# Patient Record
Sex: FEMALE | Race: WHITE | Employment: FULL TIME | URBAN - METROPOLITAN AREA
[De-identification: names, ages, dates, MRNs, and addresses within clinical notes are randomized per-mention and may not be internally consistent; named-entity substitution may affect disease eponyms.]

---

## 2018-08-28 ENCOUNTER — RADIANT APPOINTMENT (OUTPATIENT)
Dept: MAMMOGRAPHY | Facility: CLINIC | Age: 48
End: 2018-08-28
Attending: OBSTETRICS & GYNECOLOGY
Payer: COMMERCIAL

## 2018-08-28 ENCOUNTER — OFFICE VISIT (OUTPATIENT)
Dept: OBGYN | Facility: CLINIC | Age: 48
End: 2018-08-28
Payer: COMMERCIAL

## 2018-08-28 VITALS
BODY MASS INDEX: 26.95 KG/M2 | SYSTOLIC BLOOD PRESSURE: 108 MMHG | DIASTOLIC BLOOD PRESSURE: 68 MMHG | WEIGHT: 171.7 LBS | HEIGHT: 67 IN

## 2018-08-28 DIAGNOSIS — Z12.31 VISIT FOR SCREENING MAMMOGRAM: ICD-10-CM

## 2018-08-28 DIAGNOSIS — Z00.00 ROUTINE HISTORY AND PHYSICAL EXAMINATION OF ADULT: Primary | ICD-10-CM

## 2018-08-28 PROCEDURE — 77067 SCR MAMMO BI INCL CAD: CPT | Mod: TC

## 2018-08-28 PROCEDURE — G0145 SCR C/V CYTO,THINLAYER,RESCR: HCPCS | Performed by: OBSTETRICS & GYNECOLOGY

## 2018-08-28 PROCEDURE — 99396 PREV VISIT EST AGE 40-64: CPT | Performed by: OBSTETRICS & GYNECOLOGY

## 2018-08-28 PROCEDURE — 87624 HPV HI-RISK TYP POOLED RSLT: CPT | Performed by: OBSTETRICS & GYNECOLOGY

## 2018-08-28 NOTE — NURSING NOTE
"Chief Complaint   Patient presents with     Gyn Exam   No concerns.  Savannah Davis MA      Initial /68 (BP Location: Left arm, Patient Position: Chair, Cuff Size: Adult Regular)  Ht 5' 6.5\" (1.689 m)  Wt 171 lb 11.2 oz (77.9 kg)  LMP 2018  BMI 27.3 kg/m2 Estimated body mass index is 27.3 kg/(m^2) as calculated from the following:    Height as of this encounter: 5' 6.5\" (1.689 m).    Weight as of this encounter: 171 lb 11.2 oz (77.9 kg).  BP completed using cuff size: regular        The following HM Due: pap smear      The following patient reported/Care Every where data was sent to:  P ABSTRACT QUALITY INITIATIVES [[id number]]                      "

## 2018-08-28 NOTE — MR AVS SNAPSHOT
"              After Visit Summary   8/28/2018    Avis Dinero    MRN: [id number]           Patient Information     Date Of Birth          1970        Visit Information        Provider Department      8/28/2018 3:30 PM Bhupinder Miller MD [facility]        Today's Diagnoses     Routine history and physical examination of adult    -  1       Follow-ups after your visit        Who to contact     If you have questions or need follow up information about today's clinic visit or your schedule please contact [facility] directly at [phone].  Normal or non-critical lab and imaging results will be communicated to you by Happy Cosashart, letter or phone within 4 business days after the clinic has received the results. If you do not hear from us within 7 days, please contact the clinic through Happy Cosashart or phone. If you have a critical or abnormal lab result, we will notify you by phone as soon as possible.  Submit refill requests through [employer] or call your pharmacy and they will forward the refill request to us. Please allow 3 business days for your refill to be completed.          Additional Information About Your Visit        MyChart Information     [employer] gives you secure access to your electronic health record. If you see a primary care provider, you can also send messages to your care team and make appointments. If you have questions, please call your primary care clinic.  If you do not have a primary care provider, please call [phone] and they will assist you.        Care EveryWhere ID     This is your Care EveryWhere ID. This could be used by other organizations to access your [facility] medical records  [id number]        Your Vitals Were     Height Last Period BMI (Body Mass Index)             5' 6.5\" (1.689 m) 08/18/2018 27.3 kg/m2          Blood Pressure from Last 3 Encounters:   08/28/18 108/68   11/03/15 104/60   06/25/15 134/80    Weight from Last 3 " Encounters:   08/28/18 171 lb 11.2 oz (77.9 kg)   11/03/15 153 lb (69.4 kg)   06/25/15 142 lb (64.4 kg)              Today, you had the following     No orders found for display       Primary Care Provider Office Phone # Fax #    Bhupinder Miller [phone]       [address] [facility] [address]        Equal Access to Services     MARYA OSMAN : Hadii aad ku hadasho Soomaali, waaxda luqadaha, qaybta kaalmada adeegyada, waxay idiin hayaan adeeg kharash la'catrachito . So [facility] [phone].    ATENCIÓN: Si habla español, tiene a zamora disposición servicios gratuitos de asistencia lingüística. Llame al [phone].    We comply with applicable federal civil rights laws and [place] laws. We do not discriminate on the basis of race, color, national origin, age, disability, sex, sexual orientation, or gender identity.            Thank you!     Thank you for choosing [facility]  for your care. Our goal is always to provide you with excellent care. Hearing back from our patients is one way we can continue to improve our services. Please take a few minutes to complete the written survey that you may receive in the mail after your visit with us. Thank you!             Your Updated Medication List - Protect others around you: Learn how to safely use, store and throw away your medicines at www.disposemymeds.org.      Notice  As of 8/28/2018  4:03 PM    You have not been prescribed any medications.

## 2018-08-28 NOTE — PROGRESS NOTES
"SUBJECTIVE:  Avis Dinero is a 48 year old,  female,  P2 woman who presents for annual exam. Patient's last menstrual period was 2018.  Periods are sporatic q 28-60 days, lasting 5 day s. Dysmenorrhea none. Saturates 1 pad or tampon in 2 hours.  Cyclic symptoms include breast tenderness, bloating, fluid retention and irritability.    Current contraception: none   History of abnormal Pap smear: yes - ASCUS, nl. repeat  Regular self breast exam: No  Family history of breast can cer: no. Colon cancer no. Ovarian cancer no.  History of abnormal lipids: no      Past Medical History:   Diagnosis Date     Bowel obstruction      Other and unspecified adverse effect of drug, medicinal and biological substance      Urinary tract infection, site not specified        Past Surgical History:   Procedure Laterality Date     CHOLECYSTECTOMY         No current outpatient prescriptions on file.     No current facility-administered medications for this visit.      Allergies   Allergen Reactions     Penicillins Hives       Social History   Substance Use Topics     Smoking status: Former Smoker     Smokeless tobacco: Never Used      Comment: quit      Alcohol use Yes      Comment: occassionally       Review of Systems    CONSTITUTIONAL:NEGATIVE  EYES: NEGATIVE  ENT/MOUTH: NEGATIVE  RESP: NEGATIVE  CV: NEGATIVE  GI: NEGATIVE  : NEGATIVE  MUSCULOSKELATAL: NEGATIVE  INTEGUMENTARY/SKIN: NEGATIVE  BREAST: NEGATIVE  NEURO: NEGATIVE.      OBJECTIVE:  /68 (BP Location: Left arm, Patient Position: Chair, Cuff Size: Adult Regular)  Ht 5' 6.5\" (1.689 m)  Wt 171 lb 11.2 oz (77.9 kg)  LMP 2018  BMI 27.3 kg/m2  General appearance: Healthy.  Skin: Normal.  Mental Status: cooperative, normal affect, no gross thought process defects.  Thyroid: Normal to palpation, no enlargement or nodules noted.  Breasts: Symmetric without mass tenderness or discharge.  Axillary nodes negative.  Lungs: Clear to " auscultation.   Heart.: Normal rate and rhythm.  No murmurs, clicks or gallops.   Abdomen: BS active. Soft, non-tender, no masses or organomegaly.    Pelvis: normal external genitalia, normal groin lymphatics, normal urethral meatus, normal vaginal mucosa, normal cervix, normal adnexa, no masses or tenderness, uterus normal size and shape and uterus antiverted.   Extremities: Normal     ASSESSMENT:  Satisfactory annual gyn exam    PLAN:    1) Pap smear  2) Mammography, lipids at appropriate intervals        PE: reviewed health maintenance including diet, regular exercise and periodic exams.

## 2018-08-31 LAB
COPATH REPORT: NORMAL
PAP: NORMAL

## 2018-09-04 LAB
FINAL DIAGNOSIS: NORMAL
HPV HR 12 DNA CVX QL NAA+PROBE: NEGATIVE
HPV16 DNA SPEC QL NAA+PROBE: NEGATIVE
HPV18 DNA SPEC QL NAA+PROBE: NEGATIVE
SPECIMEN DESCRIPTION: NORMAL
SPECIMEN SOURCE CVX/VAG CYTO: NORMAL

## 2018-09-12 ENCOUNTER — TELEPHONE (OUTPATIENT)
Dept: OBGYN | Facility: CLINIC | Age: 48
End: 2018-09-12

## 2018-09-12 NOTE — TELEPHONE ENCOUNTER
"Dr. Miller - please advise.  Pap result notes copied and pasted below [2nd request for provider review]:    \"Notes Recorded by Papo Grier, RN on 9/4/2018 at 4:17 PM  Hi Dr Miller,  Current pap is NIL with Neg HPV. . Office visit note states:    \"History of abnormal Pap smear: yes - ASCUS, nl. Repeat\"    I do not find any abnormal pap results in EPIC and pt does not have care everywhere in chart. Okay to recommend cotest in five years?\"       Thanks!  MARISELA De La ON, RN - Pap Tracking    "

## 2019-09-30 ENCOUNTER — HEALTH MAINTENANCE LETTER (OUTPATIENT)
Age: 49
End: 2019-09-30

## 2020-01-20 DIAGNOSIS — Z12.31 VISIT FOR SCREENING MAMMOGRAM: ICD-10-CM

## 2020-01-20 PROCEDURE — 77067 SCR MAMMO BI INCL CAD: CPT | Mod: TC

## 2020-02-17 ENCOUNTER — OFFICE VISIT (OUTPATIENT)
Dept: OBGYN | Facility: CLINIC | Age: 50
End: 2020-02-17
Payer: COMMERCIAL

## 2020-02-17 VITALS
SYSTOLIC BLOOD PRESSURE: 118 MMHG | WEIGHT: 173 LBS | BODY MASS INDEX: 27.15 KG/M2 | DIASTOLIC BLOOD PRESSURE: 80 MMHG | HEIGHT: 67 IN

## 2020-02-17 DIAGNOSIS — Z00.00 ANNUAL PHYSICAL EXAM: Primary | ICD-10-CM

## 2020-02-17 DIAGNOSIS — Z12.11 SPECIAL SCREENING FOR MALIGNANT NEOPLASMS, COLON: ICD-10-CM

## 2020-02-17 DIAGNOSIS — Z23 ENCOUNTER FOR IMMUNIZATION: ICD-10-CM

## 2020-02-17 PROCEDURE — 90471 IMMUNIZATION ADMIN: CPT | Performed by: OBSTETRICS & GYNECOLOGY

## 2020-02-17 PROCEDURE — 90715 TDAP VACCINE 7 YRS/> IM: CPT | Performed by: OBSTETRICS & GYNECOLOGY

## 2020-02-17 PROCEDURE — 90750 HZV VACC RECOMBINANT IM: CPT | Performed by: OBSTETRICS & GYNECOLOGY

## 2020-02-17 PROCEDURE — 90472 IMMUNIZATION ADMIN EACH ADD: CPT | Performed by: OBSTETRICS & GYNECOLOGY

## 2020-02-17 PROCEDURE — 99396 PREV VISIT EST AGE 40-64: CPT | Mod: 25 | Performed by: OBSTETRICS & GYNECOLOGY

## 2020-02-17 ASSESSMENT — MIFFLIN-ST. JEOR: SCORE: 1429.41

## 2020-02-17 NOTE — PROGRESS NOTES
Avis is a 49 year old  female who presents for annual exam.     Besides routine health maintenance, she has no other health concerns today .    HPI:  The patient does not have a PCP  Two kids, and two grand children.  LMP was 2019, no severe hot flashes.  Declines STI testing  Would like shingrix vaccine today   Had mammo last month which was normal  Due for TDaP    GYNECOLOGIC HISTORY:    Patient's last menstrual period was 2019 (approximate).    Regular menses? no  Her current contraception method is: none.  She  reports that she has quit smoking. She has never used smokeless tobacco.    Patient is not sexually active.  STD testing offered?  Declined  Last PHQ-9 score on record = No flowsheet data found.  Last GAD7 score on record = No flowsheet data found.  Alcohol Score = 0    HEALTH MAINTENANCE:  Cholesterol: will order  Cholesterol   Date Value Ref Range Status   2010 166 0 - 200 mg/dL Final     Comment:     LDL Cholesterol is the primary guide to therapy.   The NCEP recommends further evaluation of: patients with cholesterol <200   mg/dL   if additional risk factors are present, cholesterol >240 mg/dL, triglycerides   >150 mg/dL, or HDL <40 mg/dL.   Last Mammo: 20, Result: Normal, Next Mammo: next year   Pap: 2-19 Neg, Neg HPV  Lab Results   Component Value Date    PAP NIL 2018    PAP NIL 2015    PAP NIL 2012   Colonoscopy:  Will discuss  Dexa:  59    Health maintenance updated:  yes    HISTORY:  OB History    Para Term  AB Living   2 2 2 0 0 2   SAB TAB Ectopic Multiple Live Births   0 0 0 0 2      # Outcome Date GA Lbr Anthony/2nd Weight Sex Delivery Anes PTL Lv   2 Term 99 40w0d 10:00 3.912 kg (8 lb 10 oz) M    ROSA      Birth Comments: none      Name: Seth   1 Term 07/10[birth year]96 40w0d 14:00 3.459 kg (7 lb 10 oz) F    ROSA      Birth Comments: none      Name: Bashir       Patient Active Problem List   Diagnosis     CARDIOVASCULAR  "SCREENING; LDL GOAL LESS THAN 160     SBO (small bowel obstruction) (H)     Past Surgical History:   Procedure Laterality Date     CHOLECYSTECTOMY        Social History     Tobacco Use     Smoking status: Former Smoker     Smokeless tobacco: Never Used     Tobacco comment: quit    Substance Use Topics     Alcohol use: Yes     Comment: occassionally      Problem (# of Occurrences) Relation (Name,Age of Onset)    C.A.D. (2) Father (58):  of [place], Paternal Grandfather (70)    Family History Negative (2) Mother (b 1948), Brother (b )    Heart Disease (2) Father (58):  from [place], Paternal Grandfather (70):  from [place]    Hypertension (1) Mother (b )            No current outpatient medications on file.     No current facility-administered medications for this visit.      Allergies   Allergen Reactions     Penicillins Hives       Past medical, surgical, social and family histories were reviewed and updated in EPIC.    ROS:   12 point review of systems negative other than symptoms noted below or in the HPI.  No urinary frequency or dysuria, bladder or kidney problems    EXAM:  /80 (BP Location: Left arm, Patient Position: Chair, Cuff Size: Adult Large)   Ht 1.689 m (5' 6.5\")   Wt 78.5 kg (173 lb)   LMP 2019 (Approximate)   Breastfeeding No   BMI 27.50 kg/m     BMI: Body mass index is 27.5 kg/m .    PHYSICAL EXAM:  Constitutional:   Appearance: Well nourished, well developed, alert, in no acute distress  Neck:  Lymph Nodes:  No lymphadenopathy present    Thyroid:  Gland size normal, nontender, no nodules or masses present  on palpation  Chest:  Respiratory Effort:  Breathing unlabored  Cardiovascular:    Heart: Auscultation:  Regular rate, normal rhythm, no murmurs present  Breasts: Palpation of Breasts and Axillae:  No masses present on palpation, no breast tenderness. and No nodularity, asymmetry or nipple discharge bilaterally.  Gastrointestinal:   Abdominal Examination:  " Abdomen nontender to palpation, tone normal without rigidity or guarding, no masses present, umbilicus without lesions   Liver and Spleen:  No hepatomegaly present, liver nontender to palpation    Hernias:  No hernias present  Lymphatic: Lymph Nodes:  No other lymphadenopathy present  Skin:  General Inspection:  No rashes present, no lesions present, no areas of  discoloration  Neurologic:    Mental Status:  Oriented X3.  Normal strength and tone, sensory exam                grossly normal, mentation intact and speech normal.    Psychiatric:   Mentation appears normal and affect normal/bright.         Pelvic Exam:  External Genitalia:     Normal appearance for age, no discharge present, no tenderness present, no inflammatory lesions present, color normal  Vagina:     Normal vaginal vault without central or paravaginal defects, no discharge present, no inflammatory lesions present, no masses present  Bladder:     Nontender to palpation  Urethra:   Urethral Body:  Urethra palpation normal, urethra structural support normal   Urethral Meatus:  No erythema or lesions present  Cervix:     Appearance healthy, no lesions present, nontender to palpation, no bleeding present  Uterus:     Uterus: firm, normal sized and nontender, anteverted in position.   Adnexa:     No adnexal tenderness present, no adnexal masses present  Perineum:     Perineum within normal limits, no evidence of trauma, no rashes or skin lesions present  Anus:     Anus within normal limits, no hemorrhoids present  Inguinal Lymph Nodes:     No lymphadenopathy present  Pubic Hair:     Normal pubic hair distribution for age  Genitalia and Groin:     No rashes present, no lesions present, no areas of discoloration, no masses present      COUNSELING:   Reviewed preventive health counseling, as reflected in patient instructions       Immunizations    Vaccinated for: TDAP  And shingrix           Colon cancer screening       ([place])menopause management    BMI:  Body mass index is 27.5 kg/m .      ASSESSMENT:  49 year old female with satisfactory annual exam.    PLAN:  1. Annual physical exam  Declines STI screening.  Pap smear up to date.  Mammogram up to date.  Colonoscopy - discussed colonoscopy vs FIT testing, she will start with the FIT.  Labs due today.  Immunizations - s/p TDaP and shingrix today.  Discussed weight control, activity, and aging gracefully.   - Lipid panel reflex to direct LDL Fasting; Future  - **TSH with free T4 reflex FUTURE anytime; Future  - **Comprehensive metabolic panel FUTURE 1yr; Future    2. Encounter for immunization  - ZOSTER VACCINE RECOMBINANT ADJUVANTED IM NJX  - TDAP VACCINE    3. Special screening for malignant neoplasms, colon  - Fecal colorectal cancer screen FIT; Future     Paulina Shipman MD

## 2020-02-17 NOTE — NURSING NOTE
"Chief Complaint   Patient presents with     Physical       Initial /80 (BP Location: Left arm, Patient Position: Chair, Cuff Size: Adult Large)   Ht 1.689 m (5' 6.5\")   Wt 78.5 kg (173 lb)   LMP 2019 (Approximate)   Breastfeeding No   BMI 27.50 kg/m   Estimated body mass index is 27.5 kg/m  as calculated from the following:    Height as of this encounter: 1.689 m (5' 6.5\").    Weight as of this encounter: 78.5 kg (173 lb).  BP completed using cuff size: regular    Questioned patient about current smoking habits.  Pt. has never smoked.          The following HM Due: NONE    Ethel Magana CMA    "

## 2020-05-29 ENCOUNTER — ALLIED HEALTH/NURSE VISIT (OUTPATIENT)
Dept: NURSING | Facility: CLINIC | Age: 50
End: 2020-05-29
Payer: COMMERCIAL

## 2020-05-29 DIAGNOSIS — Z23 NEED FOR VACCINATION: Primary | ICD-10-CM

## 2020-05-29 PROCEDURE — 90750 HZV VACC RECOMBINANT IM: CPT

## 2020-05-29 PROCEDURE — 90471 IMMUNIZATION ADMIN: CPT

## 2020-05-29 NOTE — PROGRESS NOTES
Patient consents to receive outdoor care: Yes    Upon arrival, patient instructed to proceed to designated location, place vehicle in park, turn off, and remove keys     If we are unable to safely and ergonomically able to provide care- is the patient able to safely able to get out of car and transfer to a chair? Yes    Patient would like to receive their AVS via [email or web address].    Prior to immunization administration, verified patients identity using patient s name and date of birth. Please see Immunization Activity for additional information.     Screening Questionnaire for Adult Immunization    Are you sick today?   No   Do you have allergies to medications, food, a vaccine component or latex?   Yes   Have you ever had a serious reaction after receiving a vaccination?   No   Do you have a long-term health problem with heart, lung, kidney, or metabolic disease (e.g., diabetes), asthma, a blood disorder, no spleen, complement component deficiency, a cochlear implant, or a spinal fluid leak?  Are you on long-term aspirin therapy?   No   Do you have cancer, leukemia, HIV/AIDS, or any other immune system problem?   No   Do you have a parent, brother, or sister with an immune system problem?   No   In the past 3 months, have you taken medications that affect  your immune system, such as prednisone, other steroids, or anticancer drugs; drugs for the treatment of rheumatoid arthritis, Crohn s disease, or psoriasis; or have you had radiation treatments?   No   Have you had a seizure, or a brain or other nervous system problem?   No   During the past year, have you received a transfusion of blood or blood    products, or been given immune (gamma) globulin or antiviral drug?   No   For women: Are you pregnant or is there a chance you could become       pregnant during the next month?   No   Have you received any vaccinations in the past 4 weeks?   No     Immunization questionnaire was positive for at least one answer.  Notified  Varun Ro MD       Per orders of Dr. Ro, injection of Shingrix given by Rosetta Urban MA. Patient instructed to remain in clinic for 15 minutes afterwards, and to report any adverse reaction to me immediately.       Screening performed by Rosetta Urban MA on 5/29/2020 at 12:52 PM.

## 2020-10-06 ENCOUNTER — IMMUNIZATION (OUTPATIENT)
Dept: NURSING | Facility: CLINIC | Age: 50
End: 2020-10-06
Payer: COMMERCIAL

## 2020-10-06 PROCEDURE — 90471 IMMUNIZATION ADMIN: CPT

## 2020-10-06 PROCEDURE — 90682 RIV4 VACC RECOMBINANT DNA IM: CPT

## 2021-02-15 DIAGNOSIS — Z12.31 VISIT FOR SCREENING MAMMOGRAM: ICD-10-CM

## 2021-02-15 PROCEDURE — 77067 SCR MAMMO BI INCL CAD: CPT | Mod: TC | Performed by: RADIOLOGY

## 2021-04-10 ENCOUNTER — IMMUNIZATION (OUTPATIENT)
Dept: LAB | Facility: CLINIC | Age: 51
End: 2021-04-10
Payer: COMMERCIAL

## 2021-05-09 ENCOUNTER — HEALTH MAINTENANCE LETTER (OUTPATIENT)
Age: 51
End: 2021-05-09

## 2021-05-10 ENCOUNTER — OFFICE VISIT (OUTPATIENT)
Dept: OBGYN | Facility: CLINIC | Age: 51
End: 2021-05-10
Payer: COMMERCIAL

## 2021-05-10 VITALS
HEIGHT: 67 IN | WEIGHT: 164 LBS | DIASTOLIC BLOOD PRESSURE: 68 MMHG | BODY MASS INDEX: 25.74 KG/M2 | SYSTOLIC BLOOD PRESSURE: 110 MMHG

## 2021-05-10 DIAGNOSIS — Z01.419 ENCOUNTER FOR GYNECOLOGICAL EXAMINATION WITHOUT ABNORMAL FINDING: Primary | ICD-10-CM

## 2021-05-10 DIAGNOSIS — Z12.11 SCREEN FOR COLON CANCER: ICD-10-CM

## 2021-05-10 PROCEDURE — 99396 PREV VISIT EST AGE 40-64: CPT | Performed by: OBSTETRICS & GYNECOLOGY

## 2021-05-10 ASSESSMENT — MIFFLIN-ST. JEOR: SCORE: 1384.54

## 2021-05-10 NOTE — PROGRESS NOTES
Avis is a 50 year old  female who presents for annual exam.     Besides routine health maintenance, she has no other health concerns today .    HPI:  The patient does not have a PCP    Works for an [employer], did the last covid year at home.  She still intermittently was seeing her daughter with two grand kids (ages 1 and 3) and her son lives with her.   had a stroke in 2021, still recovering, he is off work and still has some issues with problem solving and memory but generally is doing pretty well.  Has a small 35 pound dog, she does get activity going for walks with the dog.    Was going to do a FIT last year, didn't do it because of covid.  Got J&J vaccine last month, just before it was taken off the market.  No issues.   Did mammogram this year already.   Not yet due for pap smear.     GYNECOLOGIC HISTORY:    Patient's last menstrual period was 2021 (approximate).    Regular menses? no  Menses every few months.  Length of menses: 4 days    Her current contraception method is: none.  She  reports that she has quit smoking. She has never used smokeless tobacco.    Patient is not sexually active.  STD testing offered?  Declined  Last PHQ-9 score on record = No flowsheet data found.  Last GAD7 score on record = No flowsheet data found.  Alcohol Score = 0    HEALTH MAINTENANCE:    Last Mammo: 2021, Result: Normal, *  Pap: 2018 Nil. Neg hpv  Lab Results   Component Value Date    PAP NIL 2018    PAP NIL 2015    PAP NIL 2012     Colonoscopy:  Will do fit test, ordered    Health maintenance updated:  yes    HISTORY:  OB History    Para Term  AB Living   2 2 2 0 0 2   SAB TAB Ectopic Multiple Live Births   0 0 0 0 2      # Outcome Date GA Lbr Anthony/2nd Weight Sex Delivery Anes PTL Lv   2 Term 99 40w0d 10:00 3.912 kg (8 lb 10 oz) M    ROSA      Birth Comments: none      Name: Seth Lee Term [birth year] 40w0d 14:00 3.459 kg (7 lb 10 oz) F     "ROSA      Birth Comments: none      Name: Bashir       Patient Active Problem List   Diagnosis     CARDIOVASCULAR SCREENING; LDL GOAL LESS THAN 160     SBO (small bowel obstruction) (H)     Past Surgical History:   Procedure Laterality Date     CHOLECYSTECTOMY        Social History     Tobacco Use     Smoking status: Former Smoker     Smokeless tobacco: Never Used     Tobacco comment: quit    Substance Use Topics     Alcohol use: Yes     Comment: occassionally      Problem (# of Occurrences) Relation (Name,Age of Onset)    C.A.D. (2) Father (58):  of [place], Paternal Grandfather (70)    Family History Negative (2) Mother (b 1948), Brother (b )    Heart Disease (2) Father (58):  from [place], Paternal Grandfather (70):  from [place]    Hypertension (1) Mother (b )    Parkinsonism (1) Mother (b )            No current outpatient medications on file.     No current facility-administered medications for this visit.      Allergies   Allergen Reactions     Penicillins Hives       Past medical, surgical, social and family histories were reviewed and updated in EPIC.    ROS:   12 point review of systems negative other than symptoms noted below or in the HPI.  No urinary frequency or dysuria, bladder or kidney problems    EXAM:  /68 (BP Location: Right arm, Patient Position: Chair, Cuff Size: Adult Regular)   Ht 1.691 m (5' 6.56\")   Wt 74.4 kg (164 lb)   LMP 2021 (Approximate)   Breastfeeding No   BMI 26.03 kg/m     BMI: Body mass index is 26.03 kg/m .    PHYSICAL EXAM:  Constitutional:   Appearance: Well nourished, well developed, alert, in no acute distress  Neck:  Lymph Nodes:  No lymphadenopathy present    Thyroid:  Gland size normal, nontender, no nodules or masses present  on palpation  Chest:  Respiratory Effort:  Breathing unlabored  Cardiovascular:    Heart: Auscultation:  Regular rate, normal rhythm, no murmurs present  Breasts: Inspection of Breasts:  No lymphadenopathy " present., Palpation of Breasts and Axillae:  No masses present on palpation, no breast tenderness., Axillary Lymph Nodes:  No lymphadenopathy present. and No nodularity, asymmetry or nipple discharge bilaterally.  Gastrointestinal:   Abdominal Examination:  Abdomen nontender to palpation, tone normal without rigidity or guarding, no masses present, umbilicus without lesions   Liver and Spleen:  No hepatomegaly present, liver nontender to palpation    Hernias:  No hernias present  Lymphatic: Lymph Nodes:  No other lymphadenopathy present  Skin:  General Inspection:  No rashes present, no lesions present, no areas of  discoloration  Neurologic:    Mental Status:  Oriented X3.  Normal strength and tone, sensory exam                grossly normal, mentation intact and speech normal.    Psychiatric:   Mentation appears normal and affect normal/bright.         Pelvic Exam:  External Genitalia:     Normal appearance for age, no discharge present, no tenderness present, no inflammatory lesions present, color normal  Vagina:     Normal vaginal vault without central or paravaginal defects, no discharge present, no inflammatory lesions present, no masses present  Bladder:     Nontender to palpation  Urethra:   Urethral Body:  Urethra palpation normal, urethra structural support normal   Urethral Meatus:  No erythema or lesions present  Cervix:     Appearance healthy, no lesions present, nontender to palpation, no bleeding present  Uterus:     Uterus: firm, normal sized and nontender, anteverted in position.   Adnexa:     No adnexal tenderness present, no adnexal masses present  Perineum:     Perineum within normal limits, no evidence of trauma, no rashes or skin lesions present  Anus:     Anus within normal limits, no hemorrhoids present  Inguinal Lymph Nodes:     No lymphadenopathy present  Pubic Hair:     Normal pubic hair distribution for age  Genitalia and Groin:     No rashes present, no lesions present, no areas of  discoloration, no masses present      COUNSELING:   Reviewed preventive health counseling, as reflected in patient instructions    BMI: Body mass index is 26.03 kg/m .      ASSESSMENT:  50 year old female with satisfactory annual exam.    PLAN:  1. Encounter for gynecological examination without abnormal finding   Pap smear up to date.  Mammogram up to date.  Colonoscopy - fit test ordered.  Labs not due today.  Immunizations up to date.  Discussed weight control, activity, and aging gracefully.     2. Screen for colon cancer  - Fecal colorectal cancer screen FIT; Future     Paulina Shipman MD

## 2021-05-10 NOTE — NURSING NOTE
"Chief Complaint   Patient presents with     Physical       Initial /68 (BP Location: Right arm, Patient Position: Chair, Cuff Size: Adult Regular)   Ht 1.691 m (5' 6.56\")   Wt 74.4 kg (164 lb)   LMP 2021 (Approximate)   Breastfeeding No   BMI 26.03 kg/m   Estimated body mass index is 26.03 kg/m  as calculated from the following:    Height as of this encounter: 1.691 m (5' 6.56\").    Weight as of this encounter: 74.4 kg (164 lb).  BP completed using cuff size: regular    Questioned patient about current smoking habits.  Pt. has never smoked.          The following HM Due: NONE    Ethel Magana CMA    "

## 2021-10-24 ENCOUNTER — HEALTH MAINTENANCE LETTER (OUTPATIENT)
Age: 51
End: 2021-10-24

## 2021-11-03 ENCOUNTER — IMMUNIZATION (OUTPATIENT)
Dept: NURSING | Facility: CLINIC | Age: 51
End: 2021-11-03
Payer: COMMERCIAL

## 2021-11-03 PROCEDURE — 0064A PR COVID VAC MODERNA 100 MCG/0.5 ML IM: CPT

## 2021-11-03 PROCEDURE — 91301 PR COVID VAC MODERNA 100 MCG/0.5 ML IM: CPT

## 2022-02-17 ENCOUNTER — TELEPHONE (OUTPATIENT)
Dept: GASTROENTEROLOGY | Facility: CLINIC | Age: 52
End: 2022-02-17
Payer: COMMERCIAL

## 2022-02-17 DIAGNOSIS — Z11.59 ENCOUNTER FOR SCREENING FOR OTHER VIRAL DISEASES: Primary | ICD-10-CM

## 2022-02-17 NOTE — TELEPHONE ENCOUNTER
Screening Questions  Blue=prep questions Red=location Green=sedation   1. Are you active on mychart? y    2. What insurance is in the chart? [employer]     3.  Ordering/Referring Provider: Ramonita    4. BMI 24.3, If greater than 40 review exclusion criteria also will need EXTENDED PREP    5.  Respiratory Screening (If yes to any of the following HOSPITAL setting only):     Do you use daily home oxygen? n  Do you have mod to severe Obstructive Sleep Apnea? n (can be seen at [facility] or hospital setting)    Do you have Pulmonary Hypertension? n   Do you have UNCONTROLLED asthma? n    6. Have you had a heart or lung transplant? n  (If yes, please review exclusion criteria)    7. Are you currently on dialysis?n  (If yes, schedule in HOSPITAL setting only)(If yes, please send Golytely prep)    8. Do you have chronic kidney disease? n (If yes, please send Golytely prep)    9. Have you had a stroke or Transient ischemic attack (TIA) within 6 months? n (If yes, do not schedule at [facility])    10. In the past 6 months, have you had any heart related issues including cardiomyopathy or heart attack? n (If yes, please review exclusion criteria)           If yes, did it require cardiac stenting or other implantable device?n  (If yes, please review exclusion criteria)      11. Do you have any implantable devices in your body (pacemaker, defib, LVAD)? n (If yes, schedule at [facility])    12. Do you take nitroglycerin? If yes, how often? n (if yes, schedule at HOSPITAL setting)    13. Are you currently taking any blood thinners?n (If yes- inform patient to follow up with PCP or provider for follow up instructions)     14. Are you a diabetic? n (If yes, please send Golytely prep)    15. (Females) Are you currently pregnant?   If yes, how many weeks?      16. Are you taking any prescription pain medications on a routine schedule? n If yes, MAC sedation and patient will need EXTENDED PREP.    17. Do you have any chemical dependencies such as alcohol, street  drugs, or methadone? n If yes, MAC sedation     18. Do you have any history of post-traumatic stress syndrome, severe anxiety or history of psychosis? n  If yes, MAC sedation.     19. Do you transfer independently? y    20.  Do you have any issues with constipation? n   If yes, pt will need EXTENDED PREP     21. Preferred Pharmacy for Pre Prescription [facility]    Scheduling Details    Which Colonoscopy Prep was Sent?: Miralax  Type of Procedure Scheduled: Colonscopy  Surgeon: Elise  Date of Procedure: 3/11  Location:   Caller (Please ask for phone number if not scheduled by patient): Avis Dinero        Sedation Type: CS  Conscious Sedation- Needs  for 6 hours after the procedure  MAC/General-Needs  for 24 hours after procedure    Pre-op Required at [facility] [place] [place] and OR for MAC sedation: n  (if yes advise patient they will need a pre-op prior to procedure)      Informed patient they will need an adult  y  Cannot take any type of public or medical transportation alone    Pre-Procedure Covid test to be completed at [facility] or Externally: y    Confirmed Nurse will call to complete assessment y    Additional comments:  (DE NANDO'S PATIENTS NEED EXTENDED PREP)

## 2022-02-18 ENCOUNTER — ANCILLARY PROCEDURE (OUTPATIENT)
Dept: MAMMOGRAPHY | Facility: CLINIC | Age: 52
End: 2022-02-18
Payer: COMMERCIAL

## 2022-02-18 DIAGNOSIS — Z12.31 VISIT FOR SCREENING MAMMOGRAM: ICD-10-CM

## 2022-02-18 PROCEDURE — 77067 SCR MAMMO BI INCL CAD: CPT | Mod: TC | Performed by: RADIOLOGY

## 2022-02-18 PROCEDURE — 77063 BREAST TOMOSYNTHESIS BI: CPT | Mod: TC | Performed by: RADIOLOGY

## 2022-03-07 ENCOUNTER — LAB (OUTPATIENT)
Dept: URGENT CARE | Facility: URGENT CARE | Age: 52
End: 2022-03-07
Payer: COMMERCIAL

## 2022-03-07 DIAGNOSIS — Z11.59 ENCOUNTER FOR SCREENING FOR OTHER VIRAL DISEASES: ICD-10-CM

## 2022-03-07 PROCEDURE — U0003 INFECTIOUS AGENT DETECTION BY NUCLEIC ACID (DNA OR RNA); SEVERE ACUTE RESPIRATORY SYNDROME CORONAVIRUS 2 (SARS-COV-2) (CORONAVIRUS DISEASE [COVID-19]), AMPLIFIED PROBE TECHNIQUE, MAKING USE OF HIGH THROUGHPUT TECHNOLOGIES AS DESCRIBED BY CMS-2020-01-R: HCPCS

## 2022-03-07 PROCEDURE — U0005 INFEC AGEN DETEC AMPLI PROBE: HCPCS

## 2022-03-08 LAB — SARS-COV-2 RNA RESP QL NAA+PROBE: NEGATIVE

## 2022-03-11 ENCOUNTER — HOSPITAL ENCOUNTER (OUTPATIENT)
Facility: CLINIC | Age: 52
Discharge: HOME | End: 2022-03-11
Attending: SURGERY | Admitting: SURGERY
Payer: COMMERCIAL

## 2022-03-11 ENCOUNTER — APPOINTMENT (OUTPATIENT)
Dept: SURGERY | Facility: PHYSICIAN GROUP | Age: 52
End: 2022-03-11
Payer: COMMERCIAL

## 2022-03-11 VITALS
SYSTOLIC BLOOD PRESSURE: 112 MMHG | RESPIRATION RATE: 14 BRPM | HEIGHT: 67 IN | HEART RATE: 81 BPM | WEIGHT: 165 LBS | DIASTOLIC BLOOD PRESSURE: 81 MMHG | OXYGEN SATURATION: 100 % | BODY MASS INDEX: 25.9 KG/M2 | TEMPERATURE: 97.5 F

## 2022-03-11 LAB — COLONOSCOPY: NORMAL

## 2022-03-11 PROCEDURE — 99153 MOD SED SAME PHYS/QHP EA: CPT | Performed by: SURGERY

## 2022-03-11 PROCEDURE — 99152 MOD SED SAME PHYS/QHP 5/>YRS: CPT | Mod: 59 | Performed by: SURGERY

## 2022-03-11 PROCEDURE — 250N000011 HC RX IP 250 OP 636: Performed by: SURGERY

## 2022-03-11 PROCEDURE — 45378 DIAGNOSTIC COLONOSCOPY: CPT | Performed by: SURGERY

## 2022-03-11 PROCEDURE — 99153 MOD SED SAME PHYS/QHP EA: CPT | Mod: 59 | Performed by: SURGERY

## 2022-03-11 PROCEDURE — G0500 MOD SEDAT ENDO SERVICE >5YRS: HCPCS | Performed by: SURGERY

## 2022-03-11 PROCEDURE — G0121 COLON CA SCRN NOT HI RSK IND: HCPCS | Performed by: SURGERY

## 2022-03-11 RX ORDER — LIDOCAINE 40 MG/G
CREAM TOPICAL
Status: DISCONTINUED | OUTPATIENT
Start: 2022-03-11 | End: 2022-03-11 | Stop reason: HOSPADM

## 2022-03-11 RX ORDER — ONDANSETRON 2 MG/ML
4 INJECTION INTRAMUSCULAR; INTRAVENOUS
Status: DISCONTINUED | OUTPATIENT
Start: 2022-03-11 | End: 2022-03-11 | Stop reason: HOSPADM

## 2022-03-11 RX ORDER — FENTANYL CITRATE 50 UG/ML
INJECTION, SOLUTION INTRAMUSCULAR; INTRAVENOUS PRN
Status: COMPLETED | OUTPATIENT
Start: 2022-03-11 | End: 2022-03-11

## 2022-03-11 RX ADMIN — FENTANYL CITRATE 100 MCG: 50 INJECTION, SOLUTION INTRAMUSCULAR; INTRAVENOUS at 10:24

## 2022-03-11 RX ADMIN — MIDAZOLAM 1 MG: 1 INJECTION INTRAMUSCULAR; INTRAVENOUS at 10:33

## 2022-03-11 RX ADMIN — MIDAZOLAM 2 MG: 1 INJECTION INTRAMUSCULAR; INTRAVENOUS at 10:23

## 2022-03-11 NOTE — H&P
"[facility] Anesthesia Pre-op History and Physical    Avis Dinero MRN# [id number]   Age: 51 year old YOB: 1970      Date of Surgery: 3/11/2022 [facility]      Date of Exam 3/11/2022 Facility (Same day)     Primary care provider: No Ref-Primary, Physician         Chief Complaint and/or Reason for Procedure:   Screening for colon malignancy         Active problem list:     Patient Active Problem List    Diagnosis Date Noted     SBO (small bowel obstruction) (H) 03/04/2013     Priority: Medium     CARDIOVASCULAR SCREENING; LDL GOAL LESS THAN 160 10/31/2010     Priority: Medium            Medications (include herbals and vitamins):   Any Plavix use in the last 7 days? No     Current Facility-Administered Medications   Medication     lidocaine (LMX4) cream     lidocaine 1 % 0.1-1 mL     ondansetron (ZOFRAN) injection 4 mg     sodium chloride (PF) 0.9% PF flush 3 mL     sodium chloride (PF) 0.9% PF flush 3 mL             Allergies:      Allergies   Allergen Reactions     Penicillins Hives          Physical Exam:   All vitals have been reviewed  Patient Vitals for the past 8 hrs:   BP Temp Temp src Resp SpO2 Height Weight   03/11/22 0956 114/75 97.5  F (36.4  C) Temporal 16 100 % 1.702 m (5' 7\") 74.8 kg (165 lb)     No intake/output data recorded.  Airway assessment:   Patient is able to open mouth wide  Patient is able to stick out tongue}      ENT:   Normocephalic, without obvious abnormality, atraumatic, sinuses nontender on palpation, external ears without lesions, oral pharynx with moist mucous membranes, tonsils without erythema or exudates, gums normal and good dentition.     Lungs:   No increased work of breathing, good air exchange, clear to auscultation bilaterally, no crackles or wheezing     Cardiovascular:   normal apical pulses              Lab / Radiology Results:   Reviewed         Anesthetic risk and/or ASA classification:   ASA Class 2    Kevon Nation" MD Elise

## 2022-03-11 NOTE — OP NOTE
General Surgery Brief Operative Note    Preoperative Diagnosis- Screening for malignant neoplasm    Postoperative Diagnosis- Same    Procedure- Colonoscopy-     Surgeon- Elise    Anesthesia- Conscious sedation for 25 minutes    EBL- Minimal    Findings- Normal    Specimen- None    Complications- None    Kevon Olivares M.D.  [facility]  [phone]

## 2022-05-19 ENCOUNTER — IMMUNIZATION (OUTPATIENT)
Dept: NURSING | Facility: CLINIC | Age: 52
End: 2022-05-19
Payer: COMMERCIAL

## 2022-05-19 PROCEDURE — 0064A COVID-19,PF,MODERNA (18+ YRS BOOSTER .25ML): CPT

## 2022-05-19 PROCEDURE — 91306 COVID-19,PF,MODERNA (18+ YRS BOOSTER .25ML): CPT

## 2022-07-31 ENCOUNTER — HEALTH MAINTENANCE LETTER (OUTPATIENT)
Age: 52
End: 2022-07-31

## 2022-08-08 ENCOUNTER — OFFICE VISIT (OUTPATIENT)
Dept: OBGYN | Facility: CLINIC | Age: 52
End: 2022-08-08
Payer: COMMERCIAL

## 2022-08-08 VITALS
HEIGHT: 67 IN | WEIGHT: 176 LBS | DIASTOLIC BLOOD PRESSURE: 70 MMHG | SYSTOLIC BLOOD PRESSURE: 120 MMHG | BODY MASS INDEX: 27.62 KG/M2

## 2022-08-08 DIAGNOSIS — Z00.00 WELLNESS EXAMINATION: Primary | ICD-10-CM

## 2022-08-08 PROCEDURE — 99396 PREV VISIT EST AGE 40-64: CPT | Performed by: OBSTETRICS & GYNECOLOGY

## 2022-08-08 NOTE — PROGRESS NOTES
Avis is a 52 year old  female who presents for annual exam.     Besides routine health maintenance, she has no other health concerns today .    HPI:  The patient needs to establish PCP    Looking to establish a primary at [facility].  I suggested Summer Long, she is due for labs and will order these under her new designated PCP.     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  Her current contraception method is: menopause.  She  reports that she has quit smoking. She has never used smokeless tobacco.    Patient is not sexually active.  STD testing offered?  Declined  Last PHQ-9 score on record = No flowsheet data found.  Last GAD7 score on record = No flowsheet data found.  pHQ2 = 0    HEALTH MAINTENANCE:  Cholesterol:   Cholesterol   Date Value Ref Range Status   2010 166 0 - 200 mg/dL Final     Comment:     LDL Cholesterol is the primary guide to therapy.   The NCEP recommends further evaluation of: patients with cholesterol <200   mg/dL   if additional risk factors are present, cholesterol >240 mg/dL, triglycerides   >150 mg/dL, or HDL <40 mg/dL.     Last Mammo: 2022, Result: Normal,   2018 nIL, nEG hpvPap: (  Lab Results   Component Value Date    PAP NIL 2018    PAP NIL 2015    PAP NIL 2012   Colonoscopy:  2022, Result: Normal, Next Colonoscopy: 10 years.  Health maintenance updated:  yes    HISTORY:  OB History    Para Term  AB Living   2 2 2 0 0 2   SAB IAB Ectopic Multiple Live Births   0 0 0 0 2      # Outcome Date GA Lbr Anthony/2nd Weight Sex Delivery Anes PTL Lv   2 Term 99 40w0d 10:00 3.912 kg (8 lb 10 oz) M    ROSA     "  Birth Comments: none      Name: Seth Lee Term [birth year] 40w0d 14:00 3.459 kg (7 lb 10 oz) F    ROSA      Birth Comments: none      Name: Bashir       Patient Active Problem List   Diagnosis     CARDIOVASCULAR SCREENING; LDL GOAL LESS THAN 160     SBO (small bowel obstruction) (H)     Past Surgical History:   Procedure Laterality Date     CHOLECYSTECTOMY       COLONOSCOPY N/A 3/11/2022    Procedure: COLONOSCOPY;  Surgeon: Kevon Olivares MD;  Location:  GI      Social History     Tobacco Use     Smoking status: Former Smoker     Smokeless tobacco: Never Used     Tobacco comment: quit    Substance Use Topics     Alcohol use: Yes     Comment: occassionally      Problem (# of Occurrences) Relation (Name,Age of Onset)    C.A.D. (2) Father (58):  of [place], Paternal Grandfather (70)    Family History Negative (2) Mother (b ), Brother (b )    Heart Disease (2) Father (58):  from [place], Paternal Grandfather (70):  from [place]    Hypertension (1) Mother (b )    Parkinsonism (1) Mother (b )            No current outpatient medications on file.     No current facility-administered medications for this visit.     Allergies   Allergen Reactions     Penicillins Hives       Past medical, surgical, social and family histories were reviewed and updated in EPIC.    ROS:   12 point review of systems negative other than symptoms noted below or in the HPI.  No urinary frequency or dysuria, bladder or kidney problems    EXAM:  /70 (BP Location: Right arm, Patient Position: Chair, Cuff Size: Adult Regular)   Ht 1.702 m (5' 7\")   Wt 79.8 kg (176 lb)   LMP 2021 (Approximate)   Breastfeeding No   BMI 27.57 kg/m     BMI: Body mass index is 27.57 kg/m .    PHYSICAL EXAM:  Constitutional:   Appearance: Well nourished, well developed, alert, in no acute distress  Neck:  Lymph Nodes:  No lymphadenopathy present    Thyroid:  Gland size normal, nontender, no nodules or masses present "  on palpation  Chest:  Respiratory Effort:  Breathing unlabored  Cardiovascular:    Heart: Auscultation:  Regular rate, normal rhythm, no murmurs present  Breasts: Inspection of Breasts:  No lymphadenopathy present., Palpation of Breasts and Axillae:  No masses present on palpation, no breast tenderness. and No nodularity, asymmetry or nipple discharge bilaterally.  Gastrointestinal:   Abdominal Examination:  Abdomen nontender to palpation, tone normal without rigidity or guarding, no masses present, umbilicus without lesions   Liver and Spleen:  No hepatomegaly present, liver nontender to palpation    Hernias:  No hernias present  Lymphatic: Lymph Nodes:  No other lymphadenopathy present  Skin:  General Inspection:  No rashes present, no lesions present, no areas of  discoloration  Neurologic:    Mental Status:  Oriented X3.  Normal strength and tone, sensory exam                grossly normal, mentation intact and speech normal.    Psychiatric:   Mentation appears normal and affect normal/bright.         Pelvic Exam:  External Genitalia:     Normal appearance for age, no discharge present, no tenderness present, no inflammatory lesions present, color normal  Vagina:     Normal vaginal vault without central or paravaginal defects, no discharge present, no inflammatory lesions present, no masses present  Bladder:     Nontender to palpation  Urethra:   Urethral Body:  Urethra palpation normal, urethra structural support normal   Urethral Meatus:  No erythema or lesions present  Cervix:     Appearance healthy, no lesions present, nontender to palpation, no bleeding present  Uterus:     Uterus: firm, normal sized and nontender, anteverted in position.   Adnexa:     No adnexal tenderness present, no adnexal masses present  Perineum:     Perineum within normal limits, no evidence of trauma, no rashes or skin lesions present  Anus:     Anus within normal limits, no hemorrhoids present  Inguinal Lymph Nodes:     No  lymphadenopathy present  Pubic Hair:     Normal pubic hair distribution for age  Genitalia and Groin:     No rashes present, no lesions present, no areas of discoloration, no masses present      COUNSELING:   Reviewed preventive health counseling, as reflected in patient instructions    BMI: Body mass index is 27.57 kg/m .      ASSESSMENT:  52 year old female with satisfactory annual exam.    PLAN:  1. Wellness examination  Declines STI screening.  Pap smear due next year.  Mammogram up to date.  Colonoscopy up to date.  Labs due today - she intends to establish with PCP now that she is in her 50s, will order under her intended PCP.  Immunizations up to date.  Discussed weight control, activity, and aging gracefully.   - CBC with platelets; Future  - TSH with free T4 reflex; Future  - Lipid panel reflex to direct LDL Fasting; Future  - Glucose; Future     Paulina Shipman MD

## 2022-09-20 ENCOUNTER — LAB (OUTPATIENT)
Dept: LAB | Facility: CLINIC | Age: 52
End: 2022-09-20
Payer: COMMERCIAL

## 2022-09-20 DIAGNOSIS — Z00.00 WELLNESS EXAMINATION: ICD-10-CM

## 2022-09-20 LAB
CHOLEST SERPL-MCNC: 268 MG/DL
ERYTHROCYTE [DISTWIDTH] IN BLOOD BY AUTOMATED COUNT: 13.2 % (ref 10–15)
FASTING STATUS PATIENT QL REPORTED: YES
FASTING STATUS PATIENT QL REPORTED: YES
GLUCOSE BLD-MCNC: 102 MG/DL (ref 70–99)
HCT VFR BLD AUTO: 45 % (ref 35–47)
HDLC SERPL-MCNC: 76 MG/DL
HGB BLD-MCNC: 14.7 G/DL (ref 11.7–15.7)
LDLC SERPL CALC-MCNC: 172 MG/DL
MCH RBC QN AUTO: 30.6 PG (ref 26.5–33)
MCHC RBC AUTO-ENTMCNC: 32.7 G/DL (ref 31.5–36.5)
MCV RBC AUTO: 94 FL (ref 78–100)
NONHDLC SERPL-MCNC: 192 MG/DL
PLATELET # BLD AUTO: 267 10E3/UL (ref 150–450)
RBC # BLD AUTO: 4.81 10E6/UL (ref 3.8–5.2)
TRIGL SERPL-MCNC: 101 MG/DL
TSH SERPL DL<=0.005 MIU/L-ACNC: 1.31 MU/L (ref 0.4–4)
WBC # BLD AUTO: 5 10E3/UL (ref 4–11)

## 2022-09-20 PROCEDURE — 82947 ASSAY GLUCOSE BLOOD QUANT: CPT

## 2022-09-20 PROCEDURE — 80061 LIPID PANEL: CPT

## 2022-09-20 PROCEDURE — 36415 COLL VENOUS BLD VENIPUNCTURE: CPT

## 2022-09-20 PROCEDURE — 85027 COMPLETE CBC AUTOMATED: CPT

## 2022-09-20 PROCEDURE — 84443 ASSAY THYROID STIM HORMONE: CPT

## 2022-09-21 ENCOUNTER — OFFICE VISIT (OUTPATIENT)
Dept: INTERNAL MEDICINE | Facility: CLINIC | Age: 52
End: 2022-09-21
Payer: COMMERCIAL

## 2022-09-21 VITALS
TEMPERATURE: 98.2 F | BODY MASS INDEX: 27.15 KG/M2 | SYSTOLIC BLOOD PRESSURE: 124 MMHG | HEIGHT: 67 IN | WEIGHT: 173 LBS | HEART RATE: 66 BPM | OXYGEN SATURATION: 100 % | DIASTOLIC BLOOD PRESSURE: 82 MMHG

## 2022-09-21 DIAGNOSIS — Z76.89 ENCOUNTER TO ESTABLISH CARE: Primary | ICD-10-CM

## 2022-09-21 DIAGNOSIS — Z23 HIGH PRIORITY FOR 2019-NCOV VACCINE: ICD-10-CM

## 2022-09-21 DIAGNOSIS — E78.00 PURE HYPERCHOLESTEROLEMIA: ICD-10-CM

## 2022-09-21 DIAGNOSIS — R73.01 IMPAIRED FASTING GLUCOSE: ICD-10-CM

## 2022-09-21 DIAGNOSIS — Z23 NEED FOR PROPHYLACTIC VACCINATION AND INOCULATION AGAINST INFLUENZA: ICD-10-CM

## 2022-09-21 PROCEDURE — 90682 RIV4 VACC RECOMBINANT DNA IM: CPT | Performed by: INTERNAL MEDICINE

## 2022-09-21 PROCEDURE — 91312 COVID-19,PF,PFIZER BOOSTER BIVALENT: CPT | Performed by: INTERNAL MEDICINE

## 2022-09-21 PROCEDURE — 90471 IMMUNIZATION ADMIN: CPT | Performed by: INTERNAL MEDICINE

## 2022-09-21 PROCEDURE — 99203 OFFICE O/P NEW LOW 30 MIN: CPT | Mod: 25 | Performed by: INTERNAL MEDICINE

## 2022-09-21 PROCEDURE — 0124A COVID-19,PF,PFIZER BOOSTER BIVALENT: CPT | Performed by: INTERNAL MEDICINE

## 2022-09-21 NOTE — PROGRESS NOTES
ASSESSMENT/PLAN                                                       (Z76.89) Encounter to establish care  (primary encounter diagnosis)  Comment: PMH, PSH, FH, SH, medications, allergies, immunizations, and preventative health measures reviewed and updated as appropriate.  Plan: see below for plans.      (E78.00) Pure hypercholesterolemia  (R73.01) Impaired fasting glucose  Comment: treatment not indicated at this time.  Plan: lifestyle modifications and weight loss encouraged; will repeat fasting labs in 1 year.    (Z23) Need for prophylactic vaccination and inoculation against influenza  Plan: Flu shot given today.    (Z23) High priority for 2019-nCoV vaccine  Plan: COVID-19 booster given today.    Summer Long MD   [place] [address] [address]  T: [phone], F: [phone]    SUBJECTIVE                                                      Avis Dinero is a very pleasant 52 year old female who presents to establish care:    Recent labs significant for elevated cholesterol and fasting glucose.    PMH, PSH, FH, SH, medications, allergies, immunizations, and preventative health measures reviewed and updated as appropriate.    Past Medical History:   Diagnosis Date     Impaired fasting glucose      Pure hypercholesterolemia      Past Surgical History:   Procedure Laterality Date     CHOLECYSTECTOMY OPEN       COLONOSCOPY N/A 03/11/2022    Procedure: COLONOSCOPY;  Surgeon: Kevon Olivares MD;  Location:  GI     Family History   Problem Relation Age of Onset     Hypertension Mother      Hyperlipidemia Mother      Neurologic Disorder Mother         PD     Myocardial Infarction Father 58     Hyperlipidemia Father      Hyperlipidemia Brother      Breast Cancer Paternal Grandmother      Myocardial Infarction Paternal Grandfather      Diabetes No family hx of      Cerebrovascular Disease No family hx of      Coronary Artery Disease Early Onset No family hx of       Colon Cancer No family hx of      Ovarian Cancer No family hx of      Social History     Occupational History     Occupation:    Tobacco Use     Smoking status: Former Smoker     Years: 2.00     Quit date: 1990     Years since quittin.7     Smokeless tobacco: Never Used     Tobacco comment: social smoking only   Vaping Use     Vaping Use: Never used   Substance and Sexual Activity     Alcohol use: Yes     Comment: 12 drinks/week     Drug use: No     Sexual activity: Not Currently   Social History Narrative    .    Two adult children.    Two grandchildren (as of ).    Walking a lot.      Allergies   Allergen Reactions     Penicillins Hives     Immunization History   Administered Date(s) Administered     COVID-19,PF,Abelardo 04/10/2021     COVID-19,PF,Moderna 2021     COVID-19,PF,Moderna Booster 2022     COVID-19,PF,Pfizer 12+ YRS BIVALENT Booster 2022     FLU 6-35 months 2009     Flu, Unspecified 2019     Influenza (IIV3) PF 2010, 01/15/2013     Influenza Quad, Recombinant, pf(RIV4) (Flublok) 10/06/2020, 2022     Influenza Vaccine, 6+MO IM (QUADRIVALENT W/PRESERVATIVES) 2015, 2019     TDAP Vaccine (Adacel) 2010, 2020     Zoster vaccine recombinant adjuvanted (SHINGRIX) 2020, 2020     PREVENTATIVE HEALTH                                                      BMI: overweight  Blood pressure: within normal limits   Breast CA screening: up to date   Cervical CA screening: up to date   Colon CA screening: up to date   Lung CA screening: patient does not meet screening criteria  Dexa: not medically indicated at this time   Screening cholesterol: up to date   Screening diabetes: up to date   STD testing: no risk factors present  Alcohol misuse screening: alcohol use reviewed - no intervention indicated at this time  Immunizations: reviewed; COVID-19 booster and flu shot DUE    OBJECTIVE                   "                                    /82   Pulse 66   Temp 98.2  F (36.8  C)   Ht 1.702 m (5' 7\")   Wt 78.5 kg (173 lb)   LMP 03/08/2021 (Approximate)   SpO2 100%   BMI 27.10 kg/m    Constitutional: well-appearing  Psych: normal judgment and insight; normal mood and affect; recent and remote memory intact    ---  (Note was completed, in part, with Panna voice-recognition software. Documentation was reviewed, but some grammatical, spelling, and word errors may remain.)    "

## 2022-09-21 NOTE — PATIENT INSTRUCTIONS
"    Flu shot and COVID-19 booster today.    ---    Repeat fasting labs + physical in ~1 year.    In the meantime, incorporate cardio regularly, continue to eat healthy, limit alcohol intake (empty calories), and goal weight loss of 12-15lbs in the next year.    ---    Scheduling an appointment can be hard sometimes.  Here are some tips:    For routine visits, try to schedule at least 3-6 months ahead of time.    For urgent issues:    There are \"next day\" and \"same day\" slots available in my schedule that open up at midnight each day. These thoughts are first come, first serve.    You may also call our clinic and ask the  to send me a direct message requesting \"to be worked in.\"   "

## 2023-03-09 ENCOUNTER — ANCILLARY PROCEDURE (OUTPATIENT)
Dept: MAMMOGRAPHY | Facility: CLINIC | Age: 53
End: 2023-03-09
Attending: INTERNAL MEDICINE
Payer: COMMERCIAL

## 2023-03-09 DIAGNOSIS — Z12.31 VISIT FOR SCREENING MAMMOGRAM: ICD-10-CM

## 2023-03-09 PROCEDURE — 77063 BREAST TOMOSYNTHESIS BI: CPT | Mod: TC | Performed by: RADIOLOGY

## 2023-03-09 PROCEDURE — 77067 SCR MAMMO BI INCL CAD: CPT | Mod: TC | Performed by: RADIOLOGY

## 2023-06-06 ENCOUNTER — OFFICE VISIT (OUTPATIENT)
Dept: OBGYN | Facility: CLINIC | Age: 53
End: 2023-06-06
Payer: COMMERCIAL

## 2023-06-06 VITALS
HEIGHT: 67 IN | SYSTOLIC BLOOD PRESSURE: 122 MMHG | DIASTOLIC BLOOD PRESSURE: 80 MMHG | WEIGHT: 174 LBS | BODY MASS INDEX: 27.31 KG/M2

## 2023-06-06 DIAGNOSIS — Z12.4 SCREENING FOR CERVICAL CANCER: Primary | ICD-10-CM

## 2023-06-06 DIAGNOSIS — Z01.419 ENCOUNTER FOR BREAST AND PELVIC EXAMINATION: ICD-10-CM

## 2023-06-06 PROCEDURE — 87624 HPV HI-RISK TYP POOLED RSLT: CPT | Performed by: OBSTETRICS & GYNECOLOGY

## 2023-06-06 PROCEDURE — 99396 PREV VISIT EST AGE 40-64: CPT | Performed by: OBSTETRICS & GYNECOLOGY

## 2023-06-06 PROCEDURE — G0145 SCR C/V CYTO,THINLAYER,RESCR: HCPCS | Performed by: OBSTETRICS & GYNECOLOGY

## 2023-06-06 NOTE — PROGRESS NOTES
Avis is a 52 year old  female who presents for annual exam.     Menses Postmenopausal   Patient's last menstrual period was 2021 (approximate)...  Besides routine health maintenance, she has no other health concerns today .  Her duaghter works in a dipensary, getting more hours now that recreational THC is legal in [place], so the kids are in school/.   GYNECOLOGIC HISTORY:    Avis is not sexually active   History sexually transmitted infections:No STD history  STI testing offered?  Declined  History of abnormal Pap smear: NO - age 30-65 PAP every 5 years with negative HPV co-testing recommended   Family history of breast CA: No  Family history of uterine/ovarian CA: No      HEALTH MAINTENANCE:  Mammo:, negative History of abnormal Mammo: No.  TSH: (  TSH   Date Value Ref Range Status   2022 1.31 0.40 - 4.00 mU/L Final   2013 1.37 0.4 - 5.0 mU/L Final      Lab Results   Component Value Date    PAP NIL 2018    PAP NIL 2015    PAP NIL 2012    2018 Nil, Neg HPV  PHQ2: 0      2023     1:20 PM 2022     2:34 PM   PHQ-2 (  Pfizer)   Q1: Little interest or pleasure in doing things 0 0   Q2: Feeling down, depressed or hopeless 0 0   PHQ-2 Score 0 0         HISTORY:  OB History    Para Term  AB Living   2 2 2 0 0 2   SAB IAB Ectopic Multiple Live Births   0 0 0 0 2      # Outcome Date GA Lbr Anthony/2nd Weight Sex Delivery Anes PTL Lv   2 Term 99 40w0d 10:00 3.912 kg (8 lb 10 oz) M    ROSA      Birth Comments: none      Name: Seth   1 Term [birth year] 40w0d 14:00 3.459 kg (7 lb 10 oz) F    ROSA      Birth Comments: none      Name: Bashir     Past Medical History:   Diagnosis Date     Impaired fasting glucose      Pure hypercholesterolemia      Past Surgical History:   Procedure Laterality Date     CHOLECYSTECTOMY OPEN       COLONOSCOPY N/A 2022    Procedure: COLONOSCOPY;  Surgeon: Kevon Olivares MD;  Location: [facility]  "    Family History   Problem Relation Age of Onset     Hypertension Mother      Hyperlipidemia Mother      Neurologic Disorder Mother         PD     Myocardial Infarction Father 58     Hyperlipidemia Father      Hyperlipidemia Brother      Breast Cancer Paternal Grandmother      Myocardial Infarction Paternal Grandfather      Diabetes No family hx of      Cerebrovascular Disease No family hx of      Coronary Artery Disease Early Onset No family hx of      Colon Cancer No family hx of      Ovarian Cancer No family hx of      Social History     Socioeconomic History     Marital status:      Spouse name: Rios     Number of children: 2     Years of education: 16     Highest education level: None   Occupational History     Occupation:    Tobacco Use     Smoking status: Former     Years: 2.00     Types: Cigarettes     Quit date: 1990     Years since quittin.4     Smokeless tobacco: Never     Tobacco comments:     social smoking only   Vaping Use     Vaping status: Never Used     Passive vaping exposure: Yes   Substance and Sexual Activity     Alcohol use: Yes     Comment: 12 drinks/week     Drug use: No     Sexual activity: Not Currently   Social History Narrative    .    Two adult children.    Two grandchildren (as of ).    Walking a lot.      No current outpatient medications on file.     Allergies   Allergen Reactions     Penicillins Hives       Past medical, surgical, social and family history were reviewed and updated in [facility].    EXAM:  /80 (BP Location: Left arm, Patient Position: Chair, Cuff Size: Adult Large)   Ht 1.702 m (5' 7\")   Wt 78.9 kg (174 lb)   LMP 2021 (Approximate)   Breastfeeding No   BMI 27.25 kg/m     BMI: Body mass index is 27.25 kg/m .  Constitutional: healthy, alert and no distress  Breast: No nodularity, asymmetry or nipple discharge bilaterally.  Gastrointestinal: Abdomen soft, non-tender, non-distended. No masses, " organomegaly.  :  Vulva:  No external lesions, normal female hair distribution, no inguinal adenopathy.    Urethra:  Midline, non-tender, well supported, no discharge  Vagina:  Moist, pink, no abnormal discharge, no lesions  Uterus:  Normal size , non-tender, freely mobile  cvx nl  Ovaries:  No masses appreciated, non-tender, mobile  Rectal Exam: deferred  Musculoskeletal: extremities normal  Skin: no suspicious lesions or rashes  Psychiatric: Affect appropriate, cooperative,mentation appears normal.     COUNSELING:   Reviewed preventive health counseling, as reflected in patient instructions  Special attention given to:        (Lacey)menopause management   reports that she quit smoking about 33 years ago. Her smoking use included cigarettes. She has never used smokeless tobacco.    Body mass index is 27.25 kg/m .    FRAX Risk Assessment    ASSESSMENT:  52 year old female with satisfactory annual exam    1. Encounter for breast and pelvic examination    2. Screening for cervical cancer  - Pap imaged thin layer screen with HPV - recommended age 30 - 65  - HPV Hold (Lab Only)     Paulina Shipman MD

## 2023-06-06 NOTE — NURSING NOTE
"Chief Complaint   Patient presents with     Gyn Exam       Initial /80 (BP Location: Left arm, Patient Position: Chair, Cuff Size: Adult Large)   Ht 1.702 m (5' 7\")   Wt 78.9 kg (174 lb)   LMP 2021 (Approximate)   Breastfeeding No   BMI 27.25 kg/m   Estimated body mass index is 27.25 kg/m  as calculated from the following:    Height as of this encounter: 1.702 m (5' 7\").    Weight as of this encounter: 78.9 kg (174 lb).  BP completed using cuff size: large    Questioned patient about current smoking habits.  Pt. has never smoked.          The following HM Due: pap smear    Ethel Magana CMA    "

## 2023-06-09 LAB
BKR LAB AP GYN ADEQUACY: NORMAL
BKR LAB AP GYN INTERPRETATION: NORMAL
BKR LAB AP HPV REFLEX: NORMAL
BKR LAB AP PREVIOUS ABNORMAL: NORMAL
PATH REPORT.COMMENTS IMP SPEC: NORMAL
PATH REPORT.COMMENTS IMP SPEC: NORMAL
PATH REPORT.RELEVANT HX SPEC: NORMAL

## 2023-06-13 LAB
HUMAN PAPILLOMA VIRUS 16 DNA: NEGATIVE
HUMAN PAPILLOMA VIRUS 18 DNA: NEGATIVE
HUMAN PAPILLOMA VIRUS FINAL DIAGNOSIS: NORMAL
HUMAN PAPILLOMA VIRUS OTHER HR: NEGATIVE

## 2023-11-28 ENCOUNTER — IMMUNIZATION (OUTPATIENT)
Dept: NURSING | Facility: CLINIC | Age: 53
End: 2023-11-28
Payer: COMMERCIAL

## 2023-11-28 PROCEDURE — 90471 IMMUNIZATION ADMIN: CPT

## 2023-11-28 PROCEDURE — 90686 IIV4 VACC NO PRSV 0.5 ML IM: CPT

## 2023-11-28 PROCEDURE — 90480 ADMN SARSCOV2 VAC 1/ONLY CMP: CPT

## 2023-11-28 PROCEDURE — 91320 SARSCV2 VAC 30MCG TRS-SUC IM: CPT

## 2024-02-02 SDOH — HEALTH STABILITY: PHYSICAL HEALTH: ON AVERAGE, HOW MANY MINUTES DO YOU ENGAGE IN EXERCISE AT THIS LEVEL?: 30 MIN

## 2024-02-02 SDOH — HEALTH STABILITY: PHYSICAL HEALTH: ON AVERAGE, HOW MANY DAYS PER WEEK DO YOU ENGAGE IN MODERATE TO STRENUOUS EXERCISE (LIKE A BRISK WALK)?: 4 DAYS

## 2024-02-02 ASSESSMENT — SOCIAL DETERMINANTS OF HEALTH (SDOH): HOW OFTEN DO YOU GET TOGETHER WITH FRIENDS OR RELATIVES?: THREE TIMES A WEEK

## 2024-02-06 ENCOUNTER — LAB (OUTPATIENT)
Dept: LAB | Facility: CLINIC | Age: 54
End: 2024-02-06
Payer: COMMERCIAL

## 2024-02-06 ENCOUNTER — OFFICE VISIT (OUTPATIENT)
Dept: INTERNAL MEDICINE | Facility: CLINIC | Age: 54
End: 2024-02-06
Payer: COMMERCIAL

## 2024-02-06 VITALS
HEART RATE: 71 BPM | SYSTOLIC BLOOD PRESSURE: 124 MMHG | HEIGHT: 67 IN | OXYGEN SATURATION: 99 % | RESPIRATION RATE: 14 BRPM | WEIGHT: 174.2 LBS | BODY MASS INDEX: 27.34 KG/M2 | DIASTOLIC BLOOD PRESSURE: 76 MMHG

## 2024-02-06 DIAGNOSIS — E78.00 PURE HYPERCHOLESTEROLEMIA: ICD-10-CM

## 2024-02-06 DIAGNOSIS — Z13.1 SCREENING FOR DIABETES MELLITUS: ICD-10-CM

## 2024-02-06 DIAGNOSIS — Z00.00 ROUTINE HISTORY AND PHYSICAL EXAMINATION OF ADULT: Primary | ICD-10-CM

## 2024-02-06 DIAGNOSIS — R73.01 IMPAIRED FASTING GLUCOSE: ICD-10-CM

## 2024-02-06 LAB
ALBUMIN SERPL BCG-MCNC: 4.6 G/DL (ref 3.5–5.2)
ALP SERPL-CCNC: 83 U/L (ref 40–150)
ALT SERPL W P-5'-P-CCNC: 16 U/L (ref 0–50)
ANION GAP SERPL CALCULATED.3IONS-SCNC: 10 MMOL/L (ref 7–15)
AST SERPL W P-5'-P-CCNC: 25 U/L (ref 0–45)
BILIRUB SERPL-MCNC: 0.5 MG/DL
BUN SERPL-MCNC: 12.4 MG/DL (ref 6–20)
CALCIUM SERPL-MCNC: 9.7 MG/DL (ref 8.6–10)
CHLORIDE SERPL-SCNC: 104 MMOL/L (ref 98–107)
CHOLEST SERPL-MCNC: 222 MG/DL
CREAT SERPL-MCNC: 0.74 MG/DL (ref 0.51–0.95)
DEPRECATED HCO3 PLAS-SCNC: 25 MMOL/L (ref 22–29)
EGFRCR SERPLBLD CKD-EPI 2021: >90 ML/MIN/1.73M2
FASTING STATUS PATIENT QL REPORTED: YES
GLUCOSE SERPL-MCNC: 96 MG/DL (ref 70–99)
HBA1C MFR BLD: 5.3 % (ref 0–5.6)
HDLC SERPL-MCNC: 68 MG/DL
LDLC SERPL CALC-MCNC: 139 MG/DL
NONHDLC SERPL-MCNC: 154 MG/DL
POTASSIUM SERPL-SCNC: 4.3 MMOL/L (ref 3.4–5.3)
PROT SERPL-MCNC: 7 G/DL (ref 6.4–8.3)
SODIUM SERPL-SCNC: 139 MMOL/L (ref 135–145)
TRIGL SERPL-MCNC: 76 MG/DL

## 2024-02-06 PROCEDURE — 99396 PREV VISIT EST AGE 40-64: CPT | Performed by: INTERNAL MEDICINE

## 2024-02-06 PROCEDURE — 80061 LIPID PANEL: CPT

## 2024-02-06 PROCEDURE — 83036 HEMOGLOBIN GLYCOSYLATED A1C: CPT

## 2024-02-06 PROCEDURE — 80053 COMPREHEN METABOLIC PANEL: CPT

## 2024-02-06 PROCEDURE — 36415 COLL VENOUS BLD VENIPUNCTURE: CPT

## 2024-02-06 NOTE — PROGRESS NOTES
ASSESSMENT/PLAN                                                       (Z00.00) Routine history and physical examination of adult  (primary encounter diagnosis)  Comment: PMH, PSH, FH, SH, medications, allergies, immunizations, and preventative health measures reviewed and updated as appropriate.    Summer Long MD   [address] [address]  T: [phone], F: [phone]    SUBJECTIVE                                                      Avis Dinero is a very pleasant 53 year old female who presents for a physical.    ROS:  Constitutional: no unintentional weight loss or gain reported; no fevers, chills, or sweats reported  Cardiovascular: no chest pain, palpitations, or edema reported  Respiratory: no cough, wheezing, shortness of breath, or dyspnea on exertion reported  Gastrointestinal: no nausea, vomiting, constipation, diarrhea, or abdominal pain reported  Genitourinary: no urinary frequency, urgency, dysuria, or hematuria reported  Integumentary: no rash or pruritus reported  Musculoskeletal: no back pain, muscle pain, joint pain, or joint swelling reported  Neurologic: no focal weakness, numbness, or tingling reported  Hematologic: no easy bruising or bleeding reported  Endocrine: no heat or cold intolerance reported; no polyuria or polydipsia reported  Psychiatric: no anxiety or depression reported    Past Medical History:   Diagnosis Date    Pure hypercholesterolemia      Past Surgical History:   Procedure Laterality Date    CHOLECYSTECTOMY OPEN      COLONOSCOPY N/A 03/11/2022    Procedure: COLONOSCOPY;  Surgeon: Kevon Olivares MD;  Location:  GI     Family History   Problem Relation Age of Onset    Hypertension Mother     Hyperlipidemia Mother     Neurologic Disorder Mother         PD    Myocardial Infarction Father 58    Hyperlipidemia Father     Hyperlipidemia Brother     Breast Cancer Paternal Grandmother     Myocardial Infarction Paternal  Grandfather     Diabetes No family hx of     Cerebrovascular Disease No family hx of     Coronary Artery Disease Early Onset No family hx of     Colon Cancer No family hx of     Ovarian Cancer No family hx of      Social History     Occupational History    Occupation:    Tobacco Use    Smoking status: Former     Years: 2     Types: Cigarettes     Quit date: 1990     Years since quittin.1    Smokeless tobacco: Never    Tobacco comments:     social smoking only   Vaping Use    Vaping Use: Never used   Substance and Sexual Activity    Alcohol use: Yes     Comment: 12 drinks/week    Drug use: No    Sexual activity: Not Currently   Social History Narrative    .  with [facility].    Two adult children.    Two grandchildren (as of ).    Walking a lot.      Allergies   Allergen Reactions    Penicillins Hives     Immunization History   Administered Date(s) Administered    COVID-19 12+ () (Pfizer) 2023    COVID-19 Bivalent 12+ (Pfizer) 2022    COVID-19 Monovalent 18+ (Moderna) 2021    COVID-19 Monovalent Booster 18+ (Moderna) 2022    COVID-19 Vaccine (Abelardo) 04/10/2021    Flu, Unspecified 2019    Influenza (IIV3) PF 2010, 01/15/2013    Influenza Vaccine 18-64 (Flublok) 10/06/2020, 2022    Influenza Vaccine >6 months,quad, PF 10/28/2021, 2023    Influenza Vaccine, 6+MO IM (QUADRIVALENT W/PRESERVATIVES) 2015, 2019    Influenza, seasonal, injectable, PF 2009    TDAP (Adacel,Boostrix) 2010    TDAP Vaccine (Adacel) 2010, 2020    Zoster recombinant adjuvanted (SHINGRIX) 2020, 2020     PREVENTATIVE HEALTH                                                      BMI: overweight  Blood pressure: within normal limits   Breast CA screening: up to date   Cervical CA screening: up to date   Colon CA screening: up to date   Lung CA screening: patient does not meet screening criteria  Dexa: not  "medically indicated at this time   Screening cholesterol: up to date   Screening diabetes: up to date   STD testing: no risk factors present  Alcohol misuse screening: alcohol use reviewed - no intervention indicated at this time  Immunizations: reviewed; up to date     OBJECTIVE                                                      /76   Pulse 71   Resp 14   Ht 1.702 m (5' 7\")   Wt 79 kg (174 lb 3.2 oz)   LMP 03/08/2021 (Approximate)   SpO2 99%   BMI 27.28 kg/m    Constitutional: well-appearing  Head, Ears, and Eyes: normocephalic; normal external auditory canal and pinna; tympanic membranes visualized and normal; normal lids and conjunctivae  Neck: supple, symmetric, no thyromegaly or lymphadenopathy  Respiratory: normal respiratory effort; clear to auscultation bilaterally  Cardiovascular: regular rate and rhythm; no edema  Gastrointestinal: soft, non-tender, and non-distended; no organomegaly or masses  Musculoskeletal: normal gait and station  Psych: normal judgment and insight; normal mood and affect; recent and remote memory intact    ---  (Note was completed, in part, with [email or web address] voice-recognition software. Documentation was reviewed, but some grammatical, spelling, and word errors may remain.)    "

## 2024-02-10 ENCOUNTER — OFFICE VISIT (OUTPATIENT)
Dept: URGENT CARE | Facility: URGENT CARE | Age: 54
End: 2024-02-10
Payer: COMMERCIAL

## 2024-02-10 VITALS
WEIGHT: 172 LBS | TEMPERATURE: 98.2 F | HEART RATE: 88 BPM | BODY MASS INDEX: 26.94 KG/M2 | DIASTOLIC BLOOD PRESSURE: 82 MMHG | OXYGEN SATURATION: 99 % | SYSTOLIC BLOOD PRESSURE: 122 MMHG | RESPIRATION RATE: 18 BRPM

## 2024-02-10 DIAGNOSIS — H66.002 ACUTE SUPPURATIVE OTITIS MEDIA OF LEFT EAR WITHOUT SPONTANEOUS RUPTURE OF TYMPANIC MEMBRANE, RECURRENCE NOT SPECIFIED: Primary | ICD-10-CM

## 2024-02-10 PROCEDURE — 99213 OFFICE O/P EST LOW 20 MIN: CPT | Performed by: NURSE PRACTITIONER

## 2024-02-10 RX ORDER — AZITHROMYCIN 250 MG/1
TABLET, FILM COATED ORAL
Qty: 6 TABLET | Refills: 0 | Status: SHIPPED | OUTPATIENT
Start: 2024-02-10 | End: 2024-02-15

## 2024-02-10 NOTE — PROGRESS NOTES
Assessment & Plan     Acute suppurative otitis media of left ear without spontaneous rupture of tympanic membrane, recurrence not specified  - azithromycin (ZITHROMAX) 250 MG tablet  Dispense: 6 tablet; Refill: 0       Patient Instructions   Azithromycin daily for 5 days otitis.    Push fluids  Lots of handwashing.   Ibuprofen as needed for fever or pain  Delsymor dayquil/nyquil for cough as needed     Rest as able.   F/u in the clinic if symptoms persist or worsen.        Return in about 1 week (around 2/17/2024) for with regular provider if symptoms persist.    Michaelle Ashton, BAM CNP  [facility] Woodard is a 53 year old female who presents to clinic today for the following health issues:  Chief Complaint   Patient presents with    Ear Problem     Swelling and pain in the left ear since yesterday      HPI      URI Adult    Onset of symptoms was 1 week(s) ago.  Course of illness is worsening.    Severity moderate  Current and Associated symptoms: ear pain left  Treatment measures tried include Tylenol/Ibuprofen.  Predisposing factors include None.    Review of Systems  Constitutional, HEENT, cardiovascular, pulmonary, GI, , musculoskeletal, neuro, skin, endocrine and psych systems are negative, except as otherwise noted.      Objective    /82 (BP Location: Right arm, Patient Position: Sitting, Cuff Size: Adult Regular)   Pulse 88   Temp 98.2  F (36.8  C) (Oral)   Resp 18   Wt 78 kg (172 lb)   LMP 03/08/2021 (Approximate)   SpO2 99%   BMI 26.94 kg/m    Physical Exam   GENERAL: alert and no distress  EYES: Eyes grossly normal to inspection, PERRL and conjunctivae and sclerae normal  HENT: normal cephalic/atraumatic, right ear: normal: no effusions, no erythema, normal landmarks, left ear: erythematous and purulent drainage in canal, nose and mouth without ulcers or lesions, oropharynx clear, and oral mucous membranes moist  NECK: no adenopathy, no  asymmetry, masses, or scars  RESP: lungs clear to auscultation - no rales, rhonchi or wheezes  CV: regular rate and rhythm, normal S1 S2, no S3 or S4, no murmur, click or rub, no peripheral edema  MS: no gross musculoskeletal defects noted, no edema

## 2024-02-10 NOTE — PATIENT INSTRUCTIONS
Azithromycin daily for 5 days otitis.    Push fluids  Lots of handwashing.   Ibuprofen as needed for fever or pain  Delsymor dayquil/nyquil for cough as needed     Rest as able.   F/u in the clinic if symptoms persist or worsen.

## 2024-05-07 ENCOUNTER — ANCILLARY PROCEDURE (OUTPATIENT)
Dept: MAMMOGRAPHY | Facility: CLINIC | Age: 54
End: 2024-05-07
Attending: INTERNAL MEDICINE
Payer: COMMERCIAL

## 2024-05-07 DIAGNOSIS — Z12.31 VISIT FOR SCREENING MAMMOGRAM: ICD-10-CM

## 2024-05-07 PROCEDURE — 77063 BREAST TOMOSYNTHESIS BI: CPT | Mod: TC | Performed by: RADIOLOGY

## 2024-05-07 PROCEDURE — 77067 SCR MAMMO BI INCL CAD: CPT | Mod: TC | Performed by: RADIOLOGY

## 2025-04-04 NOTE — PROGRESS NOTES
Avis is a 54 year old  female who presents for annual exam.     Besides routine health maintenance, she has no other health concerns today .    HPI:  The patient's PCP is Dr. Summer Long MD. Vance is here today for her annual GYN exam. She has no GYN complaints.    Vance denies postmenopausal symptoms, dysuria or vaginal bleeding. She is not sexually active. She does admit to some stress incontinence but it does not bother her. She does not wear a pad daily.     GYNECOLOGIC HISTORY:    Patient's last menstrual period was 2021 (approximate).    Her current contraception method is: menopause.  She  reports that she quit smoking about 35 years ago. Her smoking use included cigarettes. She started smoking about 37 years ago. She has never used smokeless tobacco.    Patient is not sexually active.  STD testing offered?  Declined  Last PHQ-9 score on record =        No data to display              Last GAD7 score on record =        No data to display              Alcohol Score =     HEALTH MAINTENANCE:  Cholesterol:   Recent Labs   Lab Test 24  1105 22  0917   CHOL 222* 268*   HDL 68 76   * 172*   TRIG 76 101     Last Mammo:  24 , Result: Normal, Next Mammo: 2025  Pap:  Lab Results   Component Value Date    GYNINTERP  2023     Negative for Intraepithelial Lesion or Malignancy (NILM)    PAP NIL 2018    PAP NIL 2015    PAP NIL 2012     Colonoscopy:  3/11/22, Result: Normal, Next Colonoscopy:10 years.  Dexa:  NA    Health maintenance updated:  Yes    HISTORY:  OB History    Para Term  AB Living   2 2 2 0 0 2   SAB IAB Ectopic Multiple Live Births   0 0 0 0 2      # Outcome Date GA Lbr Anthony/2nd Weight Sex Type Anes PTL Lv   2 Term 99 40w0d 10:00 3.912 kg (8 lb 10 oz) M    ROSA      Birth Comments: none      Name: Seth   1 Term 07/10[birth year] 40w0d 14:00 3.459 kg (7 lb 10 oz) F    ROSA      Birth Comments: none      Name: Bashir  "      Patient Active Problem List   Diagnosis    Pure hypercholesterolemia     Past Surgical History:   Procedure Laterality Date    CHOLECYSTECTOMY OPEN      COLONOSCOPY N/A 2022    Procedure: COLONOSCOPY;  Surgeon: Kevon Olivares MD;  Location:  GI      Social History     Tobacco Use    Smoking status: Former     Current packs/day: 0.00     Types: Cigarettes     Start date: 1988     Quit date: 1990     Years since quittin.2    Smokeless tobacco: Never    Tobacco comments:     social smoking only   Substance Use Topics    Alcohol use: Yes     Comment: 12 drinks/week      Problem (# of Occurrences) Relation (Name,Age of Onset)    Hypertension (1) Mother    Neurologic Disorder (1) Mother: PD    Breast Cancer (1) Paternal Grandmother    Myocardial Infarction (2) Father (58), Paternal Grandfather    Hyperlipidemia (3) Mother, Father, Brother           Negative family history of: Diabetes, Cerebrovascular Disease, Coronary Artery Disease Early Onset, Colon Cancer, Ovarian Cancer              No current outpatient medications on file.     No current facility-administered medications for this visit.     Allergies   Allergen Reactions    Penicillins Hives       Past medical, surgical, social and family histories were reviewed and updated in EPIC.    EXAM:  /76   Ht 1.67 m (5' 5.75\")   Wt 78.7 kg (173 lb 9.6 oz)   LMP 2021 (Approximate)   Breastfeeding No   BMI 28.23 kg/m     BMI: Body mass index is 28.23 kg/m .    PHYSICAL EXAM:  Constitutional:   Appearance: Well nourished, well developed, alert, in no acute distress  Breasts: Palpation of Breasts and Axillae:  No masses present on palpation, no breast tenderness. Bilateral densities  Skin:  General Inspection:  No rashes present, no lesions present, no areas of  discoloration  Neurologic:    Mental Status:  Oriented X3.  Normal strength and tone, sensory exam                grossly normal, mentation intact and speech normal.  "   Psychiatric:   Mentation appears normal and affect normal/bright.         Pelvic Exam:  External Genitalia:     Normal appearance for age, no discharge present, no tenderness present, no inflammatory lesions present, color normal  Vagina:     Normal vaginal vault without central or paravaginal defects, no discharge present, no inflammatory lesions present, no masses present. No kegel response.  Bladder:     Nontender to palpation  Urethra:   Urethral Body:  Urethra palpation normal, urethra structural support normal   Urethral Meatus:  No erythema or lesions present  Cervix:     Appearance healthy, no lesions present, nontender to palpation, no bleeding present  Uterus:     Uterus: firm, normal sized and nontender, anteverted in position.   Adnexa:     No adnexal tenderness present, no adnexal masses present  Perineum:     Perineum within normal limits, no evidence of trauma, no rashes or skin lesions present  Anus:     Anus within normal limits, no hemorrhoids present  Inguinal Lymph Nodes:     No lymphadenopathy present  Pubic Hair:     Normal pubic hair distribution for age  Genitalia and Groin:     No rashes present, no lesions present, no areas of discoloration, no masses present    COUNSELING:   Reviewed preventive health counseling, as reflected in patient instructions       Regular exercise       Healthy diet/nutrition    BMI: Body mass index is 28.23 kg/m .      ASSESSMENT:  54 year old female with satisfactory annual exam.    ICD-10-CM    1. Encounter for gynecological examination without abnormal finding  Z01.419       2. Weakness of pelvic floor  N81.89       3. Urinary, incontinence, stress female  N39.3           PLAN:  54 year old female here today for her GYN exam. GYN exam normal. No Kegel response on exam. Discussed pelvic floor PT briefly, not interested at this time. Pap up to date. Follow up in 1 year or sooner as needed.     Sendy REBOLLEDO student    I was present with the student who  participated in the service and in the documentation of the note. I have verified the history and personally performed the physical exam and medical decision-making. I agree with the assessment and plan of care as documented in the note.     BAM Napoles CNP

## 2025-04-10 ENCOUNTER — OFFICE VISIT (OUTPATIENT)
Dept: OBGYN | Facility: CLINIC | Age: 55
End: 2025-04-10
Payer: COMMERCIAL

## 2025-04-10 VITALS
SYSTOLIC BLOOD PRESSURE: 120 MMHG | HEIGHT: 66 IN | DIASTOLIC BLOOD PRESSURE: 76 MMHG | BODY MASS INDEX: 27.9 KG/M2 | WEIGHT: 173.6 LBS

## 2025-04-10 DIAGNOSIS — Z01.419 ENCOUNTER FOR GYNECOLOGICAL EXAMINATION WITHOUT ABNORMAL FINDING: Primary | ICD-10-CM

## 2025-04-10 DIAGNOSIS — N39.3 URINARY, INCONTINENCE, STRESS FEMALE: ICD-10-CM

## 2025-04-10 DIAGNOSIS — N81.89 WEAKNESS OF PELVIC FLOOR: ICD-10-CM

## 2025-04-28 SDOH — HEALTH STABILITY: PHYSICAL HEALTH: ON AVERAGE, HOW MANY DAYS PER WEEK DO YOU ENGAGE IN MODERATE TO STRENUOUS EXERCISE (LIKE A BRISK WALK)?: 3 DAYS

## 2025-04-28 SDOH — HEALTH STABILITY: PHYSICAL HEALTH: ON AVERAGE, HOW MANY MINUTES DO YOU ENGAGE IN EXERCISE AT THIS LEVEL?: 30 MIN

## 2025-04-28 ASSESSMENT — SOCIAL DETERMINANTS OF HEALTH (SDOH): HOW OFTEN DO YOU GET TOGETHER WITH FRIENDS OR RELATIVES?: TWICE A WEEK

## 2025-04-29 ENCOUNTER — OFFICE VISIT (OUTPATIENT)
Dept: INTERNAL MEDICINE | Facility: CLINIC | Age: 55
End: 2025-04-29
Payer: COMMERCIAL

## 2025-04-29 VITALS
HEART RATE: 65 BPM | WEIGHT: 175.9 LBS | TEMPERATURE: 98.1 F | HEIGHT: 66 IN | SYSTOLIC BLOOD PRESSURE: 122 MMHG | DIASTOLIC BLOOD PRESSURE: 70 MMHG | OXYGEN SATURATION: 100 % | BODY MASS INDEX: 28.27 KG/M2

## 2025-04-29 DIAGNOSIS — R10.13 DYSPEPSIA: ICD-10-CM

## 2025-04-29 DIAGNOSIS — Z13.1 SCREENING FOR DIABETES MELLITUS: ICD-10-CM

## 2025-04-29 DIAGNOSIS — Z00.00 ROUTINE HISTORY AND PHYSICAL EXAMINATION OF ADULT: Primary | ICD-10-CM

## 2025-04-29 DIAGNOSIS — Z23 NEED FOR VACCINATION: ICD-10-CM

## 2025-04-29 DIAGNOSIS — E78.00 PURE HYPERCHOLESTEROLEMIA: ICD-10-CM

## 2025-04-29 PROCEDURE — 99396 PREV VISIT EST AGE 40-64: CPT | Mod: 25 | Performed by: INTERNAL MEDICINE

## 2025-04-29 PROCEDURE — 90471 IMMUNIZATION ADMIN: CPT | Performed by: INTERNAL MEDICINE

## 2025-04-29 PROCEDURE — 90677 PCV20 VACCINE IM: CPT | Performed by: INTERNAL MEDICINE

## 2025-04-29 RX ORDER — OMEPRAZOLE 40 MG/1
40 CAPSULE, DELAYED RELEASE ORAL DAILY
Qty: 90 CAPSULE | Refills: 0 | Status: SHIPPED | OUTPATIENT
Start: 2025-04-29

## 2025-04-29 NOTE — PROGRESS NOTES
ASSESSMENT/PLAN                                                       (Z00.00) Routine history and physical examination of adult  (primary encounter diagnosis)  Comment: PMH, PSH, FH, SH, medications, allergies, immunizations, and preventative health measures reviewed and updated as appropriate.  Plan: see below for plans.      (E78.00) Pure hypercholesterolemia  (Z13.1) Screening for diabetes mellitus  Plan: fasting labs ordered - patient to schedule.     (Z23) Need for vaccination  Comment: Prevnar 20 given today.    (R10.13) Dyspepsia  Plan: lifestyle modifications encouraged; TRIAL of omeprazole 40mg daily; if symptoms worsen, change, or do not improve, patient to contact MD.      Summer Long MD   [address] [address]  T: [phone], F: [phone]    SUBJECTIVE                                                      Avis Dinero is a very pleasant 54 year old female who presents for a physical.    ROS:  Constitutional: no unintentional weight loss or gain reported; no fevers, chills, or sweats  reported    Cardiovascular: no chest pain, palpitations, or edema reported  Respiratory: no cough, wheezing, shortness of breath, or dyspnea on exertion reported  Gastrointestinal: +heartburn as of late  Genitourinary: no urinary frequency, urgency, dysuria, or hematuria reported  Integumentary: no rash or pruritus reported  Musculoskeletal: no back pain, muscle pain, joint pain, or joint swelling reported  Neurologic: no focal weakness, numbness, or tingling reported  Hematologic: no easy bruising or bleeding reported  Endocrine: no heat or cold intolerance reported; no polyuria or polydipsia reported  Psychiatric: no anxiety or depression reported    Past Medical History:   Diagnosis Date    Pure hypercholesterolemia      Past Surgical History:   Procedure Laterality Date    CHOLECYSTECTOMY OPEN      COLONOSCOPY N/A 03/11/2022    Procedure: COLONOSCOPY;  Surgeon:  Kevon Olivares MD;  Location:  GI     Family History   Problem Relation Age of Onset    Hypertension Mother     Hyperlipidemia Mother     Neurologic Disorder Mother         PD    Myocardial Infarction Father 58    Hyperlipidemia Father     Hyperlipidemia Brother     Breast Cancer Paternal Grandmother     Myocardial Infarction Paternal Grandfather     Diabetes No family hx of     Cerebrovascular Disease No family hx of     Coronary Artery Disease Early Onset No family hx of     Colon Cancer No family hx of     Ovarian Cancer No family hx of      Social History     Occupational History    Occupation:    Tobacco Use    Smoking status: Former     Types: Cigarettes    Smokeless tobacco: Never    Tobacco comments:     Quit in 1990; smoked for 2 years; social smoking only   Vaping Use    Vaping status: Never Used   Substance and Sexual Activity    Alcohol use: Yes     Comment: 12 drinks/week    Drug use: No    Sexual activity: Not Currently   Social History Narrative     (as of 2024)    Two children.    Three grandchildren (as of 2025).    Walking a lot.      Allergies   Allergen Reactions    Penicillins Hives     Current Outpatient Medications   Medication Sig    omeprazole (PRILOSEC) 40 MG DR capsule Take 1 capsule (40 mg) by mouth daily.     Immunization History   Administered Date(s) Administered    COVID-19 12+ (Pfizer) 11/28/2023    COVID-19 Bivalent 12+ (Pfizer) 09/21/2022    COVID-19 Monovalent 18+ (Moderna) 11/03/2021    COVID-19 Monovalent Booster 18+ (Moderna) 05/19/2022    COVID-19 Vaccine (Abelardo) 04/10/2021    Flu, Unspecified 09/12/2019    Influenza (IIV3) PF 11/02/2010, 01/15/2013    Influenza (prior to 2024) 12/01/2009    Influenza Vaccine 18-64 (Flublok) 10/06/2020, 09/21/2022    Influenza Vaccine >6 months,quad, PF 10/28/2021, 11/28/2023    Influenza Vaccine, 6+MO IM (QUADRIVALENT W/PRESERVATIVES) 09/17/2015, 09/12/2019    Influenza, Split Virus, Trivalent, Pf  "(Fluzone\Fluarix) 12/01/2009    Pneumococcal 20 valent Conjugate (Prevnar 20) 04/29/2025    TDAP (Adacel,Boostrix) 05/20/2010    TDAP Vaccine (Adacel) 05/21/2010, 02/17/2020    Zoster recombinant adjuvanted (Shingrix) 02/17/2020, 05/29/2020     PREVENTATIVE HEALTH                                                      BMI: overweight  Blood pressure: within normal limits   Breast CA screening: DUE - scheduled  Cervical CA screening: up to date   Colon CA screening: up to date   Lung CA screening: patient does not meet screening criteria  Dexa: not medically indicated at this time   Screening cholesterol: DUE  Screening diabetes: DUE  STD testing: not sexually active  Alcohol misuse screening: alcohol use reviewed - no intervention indicated at this time  Immunizations: reviewed;  Prevnar 20 DUE    OBJECTIVE                                                      /70   Pulse 65   Temp 98.1  F (36.7  C) (Temporal)   Ht 1.67 m (5' 5.75\")   Wt 79.8 kg (175 lb 14.4 oz)   LMP 03/08/2021 (Approximate)   SpO2 100%   BMI 28.61 kg/m    Constitutional: well-appearing  Head, Ears, and Eyes: normocephalic; normal external auditory canal and pinna; tympanic membranes visualized and normal; normal lids and conjunctivae  Neck: supple, symmetric, no thyromegaly or lymphadenopathy  Respiratory: normal respiratory effort; clear to auscultation bilaterally  Cardiovascular: regular rate and rhythm; no edema  Gastrointestinal: soft, non-tender, and non-distended; no organomegaly or masses  Musculoskeletal: normal gait and station  Psych: normal judgment and insight; normal mood and affect; recent and remote memory intact    ---  (Note was completed, in part, with [employer] voice-recognition software. Documentation was reviewed, but some grammatical, spelling, and word errors may remain.)    "

## 2025-04-29 NOTE — PATIENT INSTRUCTIONS
Prevnar 20 today.    Please schedule fasting labs when able.     Omeprazole 40mg daily and consistently x 3 months.    ---    The best way to treat acid reflux is to prevent it in the first place.  The key to preventing acid reflux is understanding what may be causing or contributing to it.  Here is some information to help with that:    ---    Gravity plays an important role in controlling reflux. Maintaining an upright  position until your meal is digested may prevent the heartburn. If heartburn occurs regularly at night, consider raising the head of the bed or inserting a triangular wedge to keep your esophagus above the stomach. Avoid lying down 1-2 hours after eating as that's when acid production is at its peak (plan early dinners and avoid bedtime snacks).    Large meals empty slower than small meals and can make you more prone to acid reflux. Try eating more frequent, smaller meals or, at the very least, avoid very large meals.    Certain foods are known to cause and exacerbate acid reflux. These include drinks like alcohol, coffee, tea, soda, tomato juice, orange juice, and lemonade and foods that include tomato, citrus fruits (lemon, lime, and oranges), onions, chocolate, and spicy and fatty foods.    Finally, being overweight can promote reflux.

## 2025-05-22 ENCOUNTER — ANCILLARY PROCEDURE (OUTPATIENT)
Dept: MAMMOGRAPHY | Facility: CLINIC | Age: 55
End: 2025-05-22
Attending: INTERNAL MEDICINE
Payer: COMMERCIAL

## 2025-05-22 DIAGNOSIS — Z12.31 VISIT FOR SCREENING MAMMOGRAM: ICD-10-CM

## 2025-05-30 ENCOUNTER — ANCILLARY PROCEDURE (OUTPATIENT)
Dept: MAMMOGRAPHY | Facility: CLINIC | Age: 55
End: 2025-05-30
Attending: INTERNAL MEDICINE
Payer: COMMERCIAL

## 2025-05-30 DIAGNOSIS — Z12.31 VISIT FOR SCREENING MAMMOGRAM: ICD-10-CM

## 2025-05-30 PROCEDURE — 77063 BREAST TOMOSYNTHESIS BI: CPT | Mod: TC | Performed by: RADIOLOGY

## 2025-05-30 PROCEDURE — 77067 SCR MAMMO BI INCL CAD: CPT | Mod: TC | Performed by: RADIOLOGY

## 2025-05-31 ENCOUNTER — RESULTS FOLLOW-UP (OUTPATIENT)
Dept: INTERNAL MEDICINE | Facility: CLINIC | Age: 55
End: 2025-05-31

## (undated) DEVICE — SOL WATER IRRIG 1000ML BOTTLE 2F7114

## (undated) RX ORDER — FENTANYL CITRATE 50 UG/ML
INJECTION, SOLUTION INTRAMUSCULAR; INTRAVENOUS
Status: DISPENSED
Start: 2022-03-11